# Patient Record
Sex: MALE | Race: BLACK OR AFRICAN AMERICAN | NOT HISPANIC OR LATINO | URBAN - METROPOLITAN AREA
[De-identification: names, ages, dates, MRNs, and addresses within clinical notes are randomized per-mention and may not be internally consistent; named-entity substitution may affect disease eponyms.]

---

## 2024-08-30 NOTE — PROGRESS NOTES
Subjective   Patient ID: Mike Brooks is a 79 y.o. male who presents for Follow-up.  HPI  This 79-year-old male last seen in the office in August 2023 is being seen in follow-up.  He has a diagnosis of sensorineural hearing loss with associated tinnitus.  There is been a past history of vertigo and has had no respiratory infections.  Because of his tinnitus he had been advised to restrict excessive salt in his diet, stay well-hydrated and avoid excessive caffeine.  Masking techniques were also advised.  [facility] and had been advised to use their services for hearing rehabilitation due to the mild to moderate sensorineural hearing loss in both ears with intact discrimination.  He had had previous examinations also to evaluate white lesions involving his tongue although these did not seem to be associated with any suggestion of leukoplakia.  Review of Systems  A 12 point ROS has been reviewed and are negative for complaint except what is stated in the history of present illness and/or past medical history as noted in the EMR  Active Ambulatory Problems     Diagnosis Date Noted    No Active Ambulatory Problems     Resolved Ambulatory Problems     Diagnosis Date Noted    No Resolved Ambulatory Problems     Past Medical History:   Diagnosis Date    Disease of stomach and duodenum, unspecified     Personal history of other diseases of the circulatory system     Personal history of other diseases of the digestive system     Personal history of other drug therapy     Personal history of other endocrine, nutritional and metabolic disease     Pure hypercholesterolemia, unspecified          Current Outpatient Medications:     acetaminophen-codeine (Tylenol w/ Codeine #4) 300-60 mg tablet, every 4 hours., Disp: , Rfl:     aliskiren (Texturna) 150 mg tablet, Take 1 tablet (150 mg) by mouth., Disp: , Rfl:     amLODIPine (Norvasc) 10 mg tablet, Take 1 tablet (10 mg) by mouth., Disp: , Rfl:     cloNIDine  "(Catapres-TTS) 0.3 mg/24 hr patch, Place on the skin., Disp: , Rfl:     insulin glargine (Lantus) 100 unit/mL (3 mL) pen, Inject 25 units once daily., Disp: , Rfl:     mesalamine (Canasa) 1,000 mg suppository, Insert into the rectum., Disp: , Rfl:     mesalamine (Lialda) 1.2 gram EC tablet, Take 4 tablets (4.8 g) by mouth., Disp: , Rfl:     rosuvastatin (Crestor) 10 mg tablet, Take 1 tablet (10 mg) by mouth., Disp: , Rfl:     spironolactone (Aldactone) 100 mg tablet, Take 1 tablet (100 mg) by mouth., Disp: , Rfl:     sucralfate (Carafate) 1 gram tablet, Take 1 tablet (1 g) by mouth., Disp: , Rfl:      Social History     Tobacco Use    Smoking status: Never    Smokeless tobacco: Never   Substance Use Topics    Alcohol use: Never       Not on File    Height 1.74 m (5' 8.5\"), weight 88.5 kg (195 lb).    Objective   Physical Exam  GENERAL KEVIN.EARANCE: Alert, in no acute distress, normal pitch and clarity of voice, overweight, cooperative.     HEAD/FACE: Normocephalic, atraumatic, normal facial movements and strength, no no tenderness to palpation, no lesions noted.     SKIN: Normal turgor, no raised or ulcerative lesions, warm and dry to palpation.     EYES: Extraocular motions intact, no nystagmus noted, pupils equal and reactive to light and accommodation, no conjunctivitis.     EARS: Both ears--external ear anatomy is normal without lesions, the right ear is mildly obstructed by cerumen and was removed otoscopically. The tympanic membranes bilaterally were normal in appearance and there was no obstruction on the left ear canal. Hearing is intact to voice, tympanic membranes are intact with no acute inflammation, light reflexes present, no effusions are noted and no mastoid tenderness found to palpation.     NOSE: No external skin lesions are noted, nares are patent, septum is intact, sinuses are nontender to palpation bilaterally, no intranasal lesions or inflammation is noted, nasal valve is normal.   "   OROPHARYNX/ORAL CAVITY: Oropharynx is not inflamed and is without lesions, mucosa of the oral cavity is intact and without lesions, tongue is midline and mobile, no inflammation or signs of leukoplakia are noted and there is no ulceration or induration noted to palpation . No abnormalities noted along the labial sulcus or buccal sulcus. There did not appear to be any signs of leukoplakia or other lesions in these areas. , TMJs are mobile, the floor of mouth and tongue itself is negative for induration and is normal to palpation, no change from previous exam     NECK: No lymphadenopathy is palpated, carotid pulses are intact, neck is supple with full range of motion, no thyroid abnormalities are noted, trachea is midline, no neck masses are palpated.     LYMPHATICS: No cervical adenopathy or supraclavicular adenopathy is palpated.     NEUROLOGIC/PSYCH; alert and oriented, cranial nerves are grossly intact, gait is without falling, no motor deficits are noted.     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  Nasally there did not appear to be any inflammation or swelling of mucous membranes.  In his oral cavity examination it was negative for any signs of leukoplakia involving the oral cavity tongue in particular.  It was some findings by his dentist number of years ago that led to his initial evaluation for leukoplakia.  He is a nonuser of tobacco products.  He is going to have an upper and lower endoscopy done in the near future due to some abdominal discomfort that he was having.  He denies troubles with swallowing and has had no throat discomfort.  Voice has been stable as well.  [facility] and I asked that he get any copies of past or future audiograms to bring with him to the office at his yearly follow-up.    This patient is advised to follow up with their PCP for all other health care issues and treatment. Dictation was done with dragon transcription and errors in spelling  and diction are possible.       Pete Yuen DMD, MD 09/03/24 2:55 PM

## 2024-09-03 ENCOUNTER — APPOINTMENT (OUTPATIENT)
Dept: OTOLARYNGOLOGY | Facility: CLINIC | Age: 79
End: 2024-09-03
Payer: MEDICARE

## 2024-09-03 ENCOUNTER — APPOINTMENT (OUTPATIENT)
Dept: AUDIOLOGY | Facility: CLINIC | Age: 79
End: 2024-09-03
Payer: MEDICARE

## 2024-09-03 VITALS — WEIGHT: 195 LBS | HEIGHT: 69 IN | BODY MASS INDEX: 28.88 KG/M2

## 2024-09-03 DIAGNOSIS — H90.3 SENSORINEURAL HEARING LOSS (SNHL) OF BOTH EARS: Primary | ICD-10-CM

## 2024-09-03 DIAGNOSIS — K13.21 LEUKOPLAKIA OF TONGUE: ICD-10-CM

## 2024-09-03 DIAGNOSIS — H93.13 TINNITUS OF BOTH EARS: ICD-10-CM

## 2024-09-03 PROCEDURE — 99213 OFFICE O/P EST LOW 20 MIN: CPT | Performed by: OTOLARYNGOLOGY

## 2024-09-03 PROCEDURE — 1159F MED LIST DOCD IN RCRD: CPT | Performed by: OTOLARYNGOLOGY

## 2024-09-03 PROCEDURE — 1036F TOBACCO NON-USER: CPT | Performed by: OTOLARYNGOLOGY

## 2024-09-03 PROCEDURE — 1160F RVW MEDS BY RX/DR IN RCRD: CPT | Performed by: OTOLARYNGOLOGY

## 2024-09-03 RX ORDER — ROSUVASTATIN CALCIUM 10 MG/1
10 TABLET, COATED ORAL
COMMUNITY
Start: 2015-10-09

## 2024-09-03 RX ORDER — CLONIDINE 0.3 MG/24H
PATCH, EXTENDED RELEASE TRANSDERMAL
COMMUNITY
Start: 2021-07-30

## 2024-09-03 RX ORDER — MESALAMINE 1000 MG/1
SUPPOSITORY RECTAL
COMMUNITY
Start: 2015-09-02

## 2024-09-03 RX ORDER — ACETAMINOPHEN AND CODEINE PHOSPHATE 300; 60 MG/1; MG/1
TABLET ORAL EVERY 4 HOURS
COMMUNITY
Start: 2015-10-07

## 2024-09-03 RX ORDER — ALISKIREN 150 MG/1
150 TABLET, FILM COATED ORAL
COMMUNITY
Start: 2015-10-28

## 2024-09-03 RX ORDER — MESALAMINE 1.2 G/1
4800 TABLET, DELAYED RELEASE ORAL
COMMUNITY
Start: 2015-10-24

## 2024-09-03 RX ORDER — SPIRONOLACTONE 100 MG/1
100 TABLET, FILM COATED ORAL
COMMUNITY
Start: 2024-03-28

## 2024-09-03 RX ORDER — INSULIN GLARGINE 100 [IU]/ML
INJECTION, SOLUTION SUBCUTANEOUS
COMMUNITY
Start: 2024-04-19

## 2024-09-03 RX ORDER — AMLODIPINE BESYLATE 10 MG/1
10 TABLET ORAL
COMMUNITY
Start: 2015-09-02

## 2024-09-03 RX ORDER — SUCRALFATE 1 G/1
1 TABLET ORAL
COMMUNITY
Start: 2015-11-01

## 2025-09-08 ENCOUNTER — APPOINTMENT (OUTPATIENT)
Dept: OTOLARYNGOLOGY | Facility: CLINIC | Age: 80
End: 2025-09-08
Payer: MEDICARE